# Patient Record
Sex: MALE | Race: AMERICAN INDIAN OR ALASKA NATIVE
[De-identification: names, ages, dates, MRNs, and addresses within clinical notes are randomized per-mention and may not be internally consistent; named-entity substitution may affect disease eponyms.]

---

## 2019-01-04 ENCOUNTER — HOSPITAL ENCOUNTER (INPATIENT)
Dept: HOSPITAL 31 - C.ER | Age: 49
LOS: 6 days | Discharge: HOME | DRG: 603 | End: 2019-01-10
Attending: INTERNAL MEDICINE | Admitting: INTERNAL MEDICINE
Payer: COMMERCIAL

## 2019-01-04 VITALS — BODY MASS INDEX: 31.8 KG/M2

## 2019-01-04 DIAGNOSIS — R05: ICD-10-CM

## 2019-01-04 DIAGNOSIS — L03.116: Primary | ICD-10-CM

## 2019-01-04 DIAGNOSIS — I73.9: ICD-10-CM

## 2019-01-04 DIAGNOSIS — R55: ICD-10-CM

## 2019-01-04 LAB
ALBUMIN SERPL-MCNC: 4.5 G/DL (ref 3.5–5)
ALBUMIN/GLOB SERPL: 1.7 {RATIO} (ref 1–2.1)
ALT SERPL-CCNC: 47 U/L (ref 21–72)
APTT BLD: 26 SECONDS (ref 21–34)
AST SERPL-CCNC: 70 U/L (ref 17–59)
BASE EXCESS BLDV CALC-SCNC: -8 MMOL/L (ref 0–2)
BASE EXCESS BLDV CALC-SCNC: 2.4 MMOL/L (ref 0–2)
BASOPHILS # BLD AUTO: 0.1 K/UL (ref 0–0.2)
BASOPHILS NFR BLD: 0.3 % (ref 0–2)
BILIRUB UR-MCNC: NEGATIVE MG/DL
BUN SERPL-MCNC: 22 MG/DL (ref 9–20)
CALCIUM SERPL-MCNC: 9.5 MG/DL (ref 8.6–10.4)
EOSINOPHIL # BLD AUTO: 0 K/UL (ref 0–0.7)
EOSINOPHIL NFR BLD: 0.1 % (ref 0–4)
ERYTHROCYTE [DISTWIDTH] IN BLOOD BY AUTOMATED COUNT: 12.9 % (ref 11.5–14.5)
GFR NON-AFRICAN AMERICAN: 50
GLUCOSE UR STRIP-MCNC: NORMAL MG/DL
HGB BLD-MCNC: 15.8 G/DL (ref 12–18)
INR PPP: 1.2
LEUKOCYTE ESTERASE UR-ACNC: (no result) LEU/UL
LYMPHOCYTE: 3 % (ref 20–40)
LYMPHOCYTES # BLD AUTO: 0.4 K/UL (ref 1–4.3)
LYMPHOCYTES NFR BLD AUTO: 2.6 % (ref 20–40)
MCH RBC QN AUTO: 29 PG (ref 27–31)
MCHC RBC AUTO-ENTMCNC: 34.1 G/DL (ref 33–37)
MCV RBC AUTO: 85.1 FL (ref 80–94)
MONOCYTE: 2 % (ref 0–10)
MONOCYTES # BLD: 0.7 K/UL (ref 0–0.8)
MONOCYTES NFR BLD: 4 % (ref 0–10)
NEUTROPHILS # BLD: 15.9 K/UL (ref 1.8–7)
NEUTROPHILS NFR BLD AUTO: 81 % (ref 50–75)
NEUTROPHILS NFR BLD AUTO: 93 % (ref 50–75)
NEUTS BAND NFR BLD: 13 % (ref 0–2)
NRBC BLD AUTO-RTO: 0 % (ref 0–2)
PCO2 BLDV: 26 MMHG (ref 40–60)
PCO2 BLDV: 29 MMHG (ref 40–60)
PH BLDV: 7.38 [PH] (ref 7.32–7.43)
PH BLDV: 7.53 [PH] (ref 7.32–7.43)
PH UR STRIP: 7 [PH] (ref 5–8)
PLATELET # BLD EST: NORMAL 10*3/UL
PLATELET # BLD: 199 K/UL (ref 130–400)
PMV BLD AUTO: 7.6 FL (ref 7.2–11.7)
PROT UR STRIP-MCNC: NEGATIVE MG/DL
PROTHROMBIN TIME: 12.8 SECONDS (ref 9.7–12.2)
RBC # BLD AUTO: 5.43 MIL/UL (ref 4.4–5.9)
RBC # UR STRIP: NEGATIVE /UL
SP GR UR STRIP: 1.01 (ref 1–1.03)
TOTAL CELLS COUNTED BLD: 100
UROBILINOGEN UR-MCNC: NORMAL MG/DL (ref 0.2–1)
VARIANT LYMPHS NFR BLD MANUAL: 1 % (ref 0–0)
VENOUS BLOOD FIO2: 21 %
VENOUS BLOOD FIO2: 21 %
VENOUS BLOOD GAS PO2: 25 MM/HG (ref 30–55)
VENOUS BLOOD GAS PO2: 35 MM/HG (ref 30–55)
WBC # BLD AUTO: 17.1 K/UL (ref 4.8–10.8)

## 2019-01-04 NOTE — C.PDOC
History Of Present Illness


Patient is a 48 year old male who presents to the ED for evaluation of a cough 

that has been persistent for 11 days. Patient went to Dr. Nikko Montes for a CXR 

today, after which he suffered a syncopal episode and was found to be febrile, 

as per Dr. Montes. Patient states that he did not lose consciousness and just 

felt SOB and had to sit down. Patient was referred to ED by Dr. Nikko Montes for 

further evaluation. At the present moment patient is no longer SOB. He has 

additional complaint of left groin pain which has progressively gotten worse and

notes pain when he moves. Patient denies any leg weakness, nausea, vomiting, 

chills, abdominal pain, or injury. 


Time Seen by Provider: 19 16:42


Chief Complaint (Nursing): Flu-like Symptoms


History Per: Patient


History/Exam Limitations: no limitations


Onset/Duration Of Symptoms: Days (11 days )


Recent travel outside of the [place]: No


Additional History Per: Patient





Past Medical History


Reviewed: Historical Data, Nursing Documentation, Vital Signs


Vital Signs: 





                                Last Vital Signs











Temp  101.4 F H  19 16:17


 


Pulse  98 H  19 16:17


 


Resp  22   19 16:17


 


BP  130/78   19 16:17


 


Pulse Ox  100   19 16:17














- Medical History


PMH: 


   Denies: Depression


Surgical History: No Surg Hx


Family History: States: Unknown Family Hx





- Social History


Hx Tobacco Use: No


Hx Alcohol Use: Yes


Hx Substance Use: No





- Immunization History


Hx Tetanus Toxoid Vaccination: No


Hx Influenza Vaccination: No


Hx Pneumococcal Vaccination: No





Review Of Systems


Constitutional: Positive for: Fever.  Negative for: Chills, Weakness (leg )


Cardiovascular: Negative for: Chest Pain


Respiratory: Positive for: Cough.  Negative for: Shortness of Breath


Gastrointestinal: Negative for: Nausea, Vomiting, Abdominal Pain


Genitourinary: Positive for: Scrotal Pain





Physical Exam





- Physical Exam


Appears: Non-toxic, No Acute Distress


Skin: Normal Color, Warm, Dry


Head: Atraumatic, Normacephalic


Eye(s): bilateral: Normal Inspection


Oral Mucosa: Moist


Neck: Normal ROM, Supple


Cardiovascular: Rhythm Regular


Respiratory: Normal Breath Sounds, No Rales, No Rhonchi, No Wheezing


Gastrointestinal/Abdominal: No Tenderness


Male Genital: No Testicular Tenderness, No Testicular Swelling, Other (no 

hernia, cellulitis, or erythema. + left groin tenderness )


Extremity: No Tenderness (bilateral leg )


Extremity: Left: Other (Limited ROM left hip due to pain), Right: Normal ROM


Pulses: Left Dorsalis Pedis: Normal, Right Dorsalis Pedis: Normal





ED Course And Treatment





- Laboratory Results


Result Diagrams: 


                                 19 18:04





                                 19 18:04


ECG: Interpreted By Me


ECG Rhythm: Sinus Rhythm


ECG Interpretation: Normal


Interpretation Of ECG: normal axis, normal intervals, no ectopy, no ST elevation


Rate From EC


O2 Sat by Pulse Oximetry: 100 (on RA )


Pulse Ox Interpretation: Normal





- Radiology


CXR: Read By Radiologist


CXR Interpretation: Yes: No Acute Disease





Medical Decision Making


Medical Decision Making: 


Patient is a 48 year old male who has presented to the ED for evaluation of a 

cough. Additional complaint of left groin pain. 





Plan:


Bloodwork, VBG, EKG, CXR, Urinalysis, blood culture, urine culture, Tylenol 975 

mg PO, Piperacillin, IV FLuids, Vancomicin 250 mg IVP 





Patient given tylenol PO for fever and groin pain. On re-eval patient appears 

much more comfortable and reports pain has improved.





Labs done and results noted, particularly elevated lactate and leukocytosis. 

Patient given 30cc/kg NS bolus. Abx ordered.





Case discussed with Dr. EVANGELIST Montes who accepts patient for admission to his service

for fever of unknown origin. CXR clear, no evidence of pneumonia. Urine pending.





Disposition





- Disposition


Disposition: HOSPITALIZED


Disposition Time: 20:34


Condition: STABLE





- Clinical Impression


Clinical Impression: 


 Fever, Left groin pain








- Scribe Statement


The provider has reviewed the documentation as recorded by the Luli Garcia 





All medical record entries made by the Luli were at my direction and 

personally dictated by me. I have reviewed the chart and agree that the record 

accurately reflects my personal performance of the history, physical exam, 

medical decision making, and the department course for this patient. I have also

personally directed, reviewed, and agree with the discharge instructions and 

disposition.

## 2019-01-04 NOTE — CP.PCM.HP
Past Patient History





- Past Social History


Smoking Status: Never Smoked





- PSYCHIATRIC


Hx Depression: No


Hx Substance Use: No





- SURGICAL HISTORY


Hx Surgeries: No





Meds


Allergies/Adverse Reactions: 


                                    Allergies











Allergy/AdvReac Type Severity Reaction Status Date / Time


 


No Known Allergies Allergy   Verified 06/29/13 10:47














Physical Exam





- Constitutional


Appears: Well





- Head Exam


Head Exam: ATRAUMATIC, NORMAL INSPECTION, NORMOCEPHALIC





- Eye Exam


Eye Exam: EOMI, Normal appearance, PERRL


Pupil Exam: NORMAL ACCOMODATION, PERRL





- ENT Exam


ENT Exam: Mucous Membranes Moist, Normal Exam





- Neck Exam


Neck exam: Positive for: Normal Inspection





- Respiratory Exam


Respiratory Exam: Decreased Breath Sounds





- Cardiovascular Exam


Cardiovascular Exam: REGULAR RHYTHM, +S1, +S2





- GI/Abdominal Exam


GI & Abdominal Exam: Diminished Bowel Sounds, Soft





- Rectal Exam


Rectal Exam: Deferred





Results





- Vital Signs


Recent Vital Signs: 





                                Last Vital Signs











Temp  99.6 F   01/04/19 21:10


 


Pulse  88   01/04/19 21:10


 


Resp  20   01/04/19 21:10


 


BP  100/60   01/04/19 21:10


 


Pulse Ox  100   01/04/19 21:10














- Labs


Result Diagrams: 


                                 01/04/19 18:04





                                 01/04/19 18:04


Labs: 





                         Laboratory Results - last 24 hr











  01/04/19 01/04/19 01/04/19





  17:50 18:04 18:04


 


WBC   17.1 H D 


 


RBC   5.43 


 


Hgb   15.8 


 


Hct   46.2 


 


MCV   85.1 


 


MCH   29.0 


 


MCHC   34.1 


 


RDW   12.9 


 


Plt Count   199 


 


MPV   7.6 


 


Neut % (Auto)   93.0 H 


 


Lymph % (Auto)   2.6 L 


 


Mono % (Auto)   4.0 


 


Eos % (Auto)   0.1 


 


Baso % (Auto)   0.3 


 


Neut # (Auto)   15.9 H 


 


Lymph # (Auto)   0.4 L 


 


Mono # (Auto)   0.7 


 


Eos # (Auto)   0.0 


 


Baso # (Auto)   0.1 


 


Neutrophils % (Manual)   81 H 


 


Band Neutrophils %   13 H* 


 


Lymphocytes % (Manual)   3 L 


 


Reactive Lymphs %   1 H 


 


Monocytes % (Manual)   2 


 


Platelet Estimate   Normal 


 


PT    12.8 H


 


INR    1.2


 


APTT    26


 


pO2  35  


 


VBG pH  7.53 H  


 


VBG pCO2  29 L  


 


VBG HCO3  26.2  


 


VBG Total CO2  25.1  


 


VBG O2 Sat (Calc)  84.9 H  


 


VBG Base Excess  2.4 H  


 


VBG Potassium  3.0 L  


 


A-a O2 Difference   


 


Sodium  138.0  


 


Chloride  104.0  


 


Glucose  100  


 


Lactate  2.8 H  


 


FiO2  21.0  


 


Crit Value Called To   


 


Crit Value Called By   


 


Crit Value Read Back   


 


Blood Gas Notified Time   


 


Potassium   


 


Carbon Dioxide   


 


Anion Gap   


 


BUN   


 


Creatinine   


 


Est GFR ( Amer)   


 


Est GFR (Non-Af Amer)   


 


Random Glucose   


 


Calcium   


 


Phosphorus   


 


Magnesium   


 


Total Bilirubin   


 


AST   


 


ALT   


 


Alkaline Phosphatase   


 


Total Protein   


 


Albumin   


 


Globulin   


 


Albumin/Globulin Ratio   


 


Venous Blood Potassium  3.0 L  


 


Urine Color   


 


Urine Clarity   


 


Urine pH   


 


Ur Specific Gravity   


 


Urine Protein   


 


Urine Glucose (UA)   


 


Urine Ketones   


 


Urine Blood   


 


Urine Nitrate   


 


Urine Bilirubin   


 


Urine Urobilinogen   


 


Ur Leukocyte Esterase   


 


Urine WBC (Auto)   


 


Influenza Typ A,B (EIA)   














  01/04/19 01/04/19 01/04/19





  18:04 19:16 19:37


 


WBC   


 


RBC   


 


Hgb   


 


Hct   


 


MCV   


 


MCH   


 


MCHC   


 


RDW   


 


Plt Count   


 


MPV   


 


Neut % (Auto)   


 


Lymph % (Auto)   


 


Mono % (Auto)   


 


Eos % (Auto)   


 


Baso % (Auto)   


 


Neut # (Auto)   


 


Lymph # (Auto)   


 


Mono # (Auto)   


 


Eos # (Auto)   


 


Baso # (Auto)   


 


Neutrophils % (Manual)   


 


Band Neutrophils %   


 


Lymphocytes % (Manual)   


 


Reactive Lymphs %   


 


Monocytes % (Manual)   


 


Platelet Estimate   


 


PT   


 


INR   


 


APTT   


 


pO2   


 


VBG pH   


 


VBG pCO2   


 


VBG HCO3   


 


VBG Total CO2   


 


VBG O2 Sat (Calc)   


 


VBG Base Excess   


 


VBG Potassium   


 


A-a O2 Difference   


 


Sodium  136  


 


Chloride  100  


 


Glucose   


 


Lactate   


 


FiO2   


 


Crit Value Called To   


 


Crit Value Called By   


 


Crit Value Read Back   


 


Blood Gas Notified Time   


 


Potassium  3.3 L  


 


Carbon Dioxide  27  


 


Anion Gap  13  


 


BUN  22 H  


 


Creatinine  1.5  


 


Est GFR ( Amer)  > 60  


 


Est GFR (Non-Af Amer)  50  


 


Random Glucose  97  


 


Calcium  9.5  


 


Phosphorus  < 0.5 L*  


 


Magnesium  1.7  


 


Total Bilirubin  0.8  


 


AST  70 H  


 


ALT  47  


 


Alkaline Phosphatase  66  


 


Total Protein  7.2  


 


Albumin  4.5  


 


Globulin  2.7  


 


Albumin/Globulin Ratio  1.7  


 


Venous Blood Potassium   


 


Urine Color    Yellow


 


Urine Clarity    Clear


 


Urine pH    7.0


 


Ur Specific Gravity    1.006


 


Urine Protein    Negative


 


Urine Glucose (UA)    Normal


 


Urine Ketones    Negative


 


Urine Blood    Negative


 


Urine Nitrate    Negative


 


Urine Bilirubin    Negative


 


Urine Urobilinogen    Normal


 


Ur Leukocyte Esterase    Neg


 


Urine WBC (Auto)    < 1


 


Influenza Typ A,B (EIA)   Negative for flu a/b 














  01/04/19





  19:50


 


WBC 


 


RBC 


 


Hgb 


 


Hct 


 


MCV 


 


MCH 


 


MCHC 


 


RDW 


 


Plt Count 


 


MPV 


 


Neut % (Auto) 


 


Lymph % (Auto) 


 


Mono % (Auto) 


 


Eos % (Auto) 


 


Baso % (Auto) 


 


Neut # (Auto) 


 


Lymph # (Auto) 


 


Mono # (Auto) 


 


Eos # (Auto) 


 


Baso # (Auto) 


 


Neutrophils % (Manual) 


 


Band Neutrophils % 


 


Lymphocytes % (Manual) 


 


Reactive Lymphs % 


 


Monocytes % (Manual) 


 


Platelet Estimate 


 


PT 


 


INR 


 


APTT 


 


pO2  25 L


 


VBG pH  7.38


 


VBG pCO2  26 L


 


VBG HCO3  17.2


 


VBG Total CO2  16.2 L


 


VBG O2 Sat (Calc)  67.5 H


 


VBG Base Excess  -8.0 L


 


VBG Potassium  1.8 L*


 


A-a O2 Difference  92.0


 


Sodium  148.0


 


Chloride  122.0 H


 


Glucose  69 L


 


Lactate  1.7


 


FiO2  21.0


 


Crit Value Called To  Er doctor


 


Crit Value Called By  Ruben


 


Crit Value Read Back  Y


 


Blood Gas Notified Time  2002


 


Potassium 


 


Carbon Dioxide 


 


Anion Gap 


 


BUN 


 


Creatinine 


 


Est GFR ( Amer) 


 


Est GFR (Non-Af Amer) 


 


Random Glucose 


 


Calcium 


 


Phosphorus 


 


Magnesium 


 


Total Bilirubin 


 


AST 


 


ALT 


 


Alkaline Phosphatase 


 


Total Protein 


 


Albumin 


 


Globulin 


 


Albumin/Globulin Ratio 


 


Venous Blood Potassium  1.8 L*


 


Urine Color 


 


Urine Clarity 


 


Urine pH 


 


Ur Specific Gravity 


 


Urine Protein 


 


Urine Glucose (UA) 


 


Urine Ketones 


 


Urine Blood 


 


Urine Nitrate 


 


Urine Bilirubin 


 


Urine Urobilinogen 


 


Ur Leukocyte Esterase 


 


Urine WBC (Auto) 


 


Influenza Typ A,B (EIA)

## 2019-01-04 NOTE — RAD
HISTORY:

 Sepsis Patient 



COMPARISON:

Chest x-ray performed 7/25/13 



TECHNIQUE:

Chest, one view.



FINDINGS:

Examination limited by habitus. 



LUNGS:

No focal consolidation.



Please note that chest x-ray has limited sensitivity for the 

detection of pulmonary masses.



PLEURA:

No significant pleural effusion identified. No definite pneumothorax .



CARDIOVASCULAR:

Heart size appears within normal limits.  Atherosclerotic 

calcification present. 



OSSEOUS STRUCTURES:

No acute osseous abnormality identified.



VISUALIZED UPPER ABDOMEN:

Unremarkable.



OTHER FINDINGS:

None.



IMPRESSION:

No focal consolidation.

## 2019-01-05 RX ADMIN — TAZOBACTAM SODIUM AND PIPERACILLIN SODIUM SCH MLS/HR: 375; 3 INJECTION, SOLUTION INTRAVENOUS at 20:22

## 2019-01-05 RX ADMIN — TAZOBACTAM SODIUM AND PIPERACILLIN SODIUM SCH MLS/HR: 375; 3 INJECTION, SOLUTION INTRAVENOUS at 14:33

## 2019-01-05 RX ADMIN — ENOXAPARIN SODIUM SCH MG: 40 INJECTION SUBCUTANEOUS at 10:05

## 2019-01-05 RX ADMIN — TAZOBACTAM SODIUM AND PIPERACILLIN SODIUM SCH MLS/HR: 375; 3 INJECTION, SOLUTION INTRAVENOUS at 08:25

## 2019-01-05 RX ADMIN — TAZOBACTAM SODIUM AND PIPERACILLIN SODIUM SCH MLS/HR: 375; 3 INJECTION, SOLUTION INTRAVENOUS at 02:19

## 2019-01-06 LAB
ALBUMIN SERPL-MCNC: 3.9 G/DL (ref 3.5–5)
ALBUMIN/GLOB SERPL: 1.4 {RATIO} (ref 1–2.1)
ALT SERPL-CCNC: 33 U/L (ref 21–72)
AST SERPL-CCNC: 35 U/L (ref 17–59)
BASOPHILS # BLD AUTO: 0.1 K/UL (ref 0–0.2)
BASOPHILS NFR BLD: 0.8 % (ref 0–2)
BUN SERPL-MCNC: 14 MG/DL (ref 9–20)
CALCIUM SERPL-MCNC: 8.8 MG/DL (ref 8.6–10.4)
EOSINOPHIL # BLD AUTO: 0.2 K/UL (ref 0–0.7)
EOSINOPHIL NFR BLD: 1.4 % (ref 0–4)
ERYTHROCYTE [DISTWIDTH] IN BLOOD BY AUTOMATED COUNT: 13 % (ref 11.5–14.5)
GFR NON-AFRICAN AMERICAN: 50
HGB BLD-MCNC: 14.5 G/DL (ref 12–18)
LYMPHOCYTES # BLD AUTO: 1.9 K/UL (ref 1–4.3)
LYMPHOCYTES NFR BLD AUTO: 13.5 % (ref 20–40)
MCH RBC QN AUTO: 29.1 PG (ref 27–31)
MCHC RBC AUTO-ENTMCNC: 33.5 G/DL (ref 33–37)
MCV RBC AUTO: 86.8 FL (ref 80–94)
MONOCYTES # BLD: 0.6 K/UL (ref 0–0.8)
MONOCYTES NFR BLD: 4.4 % (ref 0–10)
NEUTROPHILS # BLD: 11.2 K/UL (ref 1.8–7)
NEUTROPHILS NFR BLD AUTO: 79.9 % (ref 50–75)
NRBC BLD AUTO-RTO: 0 % (ref 0–2)
PLATELET # BLD: 167 K/UL (ref 130–400)
PMV BLD AUTO: 7.6 FL (ref 7.2–11.7)
RBC # BLD AUTO: 4.98 MIL/UL (ref 4.4–5.9)
WBC # BLD AUTO: 14 K/UL (ref 4.8–10.8)

## 2019-01-06 RX ADMIN — TAZOBACTAM SODIUM AND PIPERACILLIN SODIUM SCH MLS/HR: 375; 3 INJECTION, SOLUTION INTRAVENOUS at 03:27

## 2019-01-06 RX ADMIN — ENOXAPARIN SODIUM SCH MG: 40 INJECTION SUBCUTANEOUS at 09:59

## 2019-01-06 RX ADMIN — TAZOBACTAM SODIUM AND PIPERACILLIN SODIUM SCH MLS/HR: 375; 3 INJECTION, SOLUTION INTRAVENOUS at 08:23

## 2019-01-06 RX ADMIN — TAZOBACTAM SODIUM AND PIPERACILLIN SODIUM SCH MLS/HR: 375; 3 INJECTION, SOLUTION INTRAVENOUS at 14:03

## 2019-01-06 RX ADMIN — TAZOBACTAM SODIUM AND PIPERACILLIN SODIUM SCH MLS/HR: 375; 3 INJECTION, SOLUTION INTRAVENOUS at 20:17

## 2019-01-06 RX ADMIN — VANCOMYCIN HYDROCHLORIDE SCH MLS/HR: 1 INJECTION, POWDER, LYOPHILIZED, FOR SOLUTION INTRAVENOUS at 17:43

## 2019-01-06 NOTE — CP.PCM.CON
History of Present Illness





- History of Present Illness


History of Present Illness: 





48 year old male came  to the ED for evaluation of a cough that has been 

persistent for 11 days. Patient went to Dr. Nikko Montes for a CXR today, after 

which he suffered a syncopal episode and was found to be febrile, Patient also 

c/o swelling LLE  denies chest pain  fever less  + cough 








- Medical History


PMH: cellulitis 


   Denies: Depression


Surgical History: No Surg Hx


Family History: States: Unknown Family Hx





- Social History


Hx Tobacco Use: No





Review of Systems





- Review of Systems


All systems: reviewed and no additional remarkable complaints except





- Constitutional


Constitutional: As Per HPI





- EENT


Eyes: absent: As Per HPI, Blind Spots, Blurred Vision, Change in Vision, 

Decreased Night Vision, Diplopia, Discharge, Dry Eye, Exophthalmos, Floaters, 

Irritation, Itchy Eyes, Loss of Peripheral Vision, Pain, Photophobia, Requires 

Corrective Lenses, Sees Flashes, Spots in Vision, Tunnel Vision, Other Visual 

Disturbances, Loss of Vision, Other


Ears: absent: As Per HPI, Decreased Hearing, Ear Discharge, Ear Pain, Tinnitus, 

Abnormal Hearing, Disequilibrium, Dizziness, Other


Nose/Mouth/Throat: absent: As Per HPI, Epistaxis, Nasal Congestion, Nasal 

Discharge, Nasal Obstruction, Nasal Trauma, Nose Pain, Post Nasal Drip, Sinus 

Pain, Sinus Pressure, Bleeding Gums, Change in Voice, Dental Pain, Dry Mouth, 

Dysphagia, Halitosis, Hoarsness, Lip Swelling, Mouth Lesions, Mouth Pain, 

Odynophagia, Sore Throat, Throat Swelling, Tongue Swelling, Facial Pain, Neck 

Pain, Neck Mass, Other





- Cardiovascular


Cardiovascular: As Per HPI





- Respiratory


Respiratory: As Per HPI, Cough.  absent: Dyspnea





- Gastrointestinal


Gastrointestinal: absent: As Per HPI, Abdominal Pain, Belching, Bloating, Change

in Bowel Habits, Change in Stool Character, Coffee Ground Emesis, Constipation, 

Cramping, Diarrhea, Dyspepsia, Dysphagia, Early Satiety, Excessive Flatus, Fecal

Incontinence, Heartburn, Hematemesis, Hematochezia, Loose Stools, Melena, 

Nausea, Odynophagia, Temesmus, Vomiting, Other





- Genitourinary


Genitourinary: absent: As Per HPI, Change in Urinary Stream, Difficulty 

Urinating, Dysuria, Flank Pain, Hematuria, Pyuria, Nocturia, Urinary 

Incontinence, Urinary Frequency, Urinary Hesitance, Urinary Urgency, Voiding 

Freq/Small Amts, Freq UTI, Hx Renal/Bladder Calculi, Hx /Renal Surgery, 

Bladder Distension, Other





- Musculoskeletal


Musculoskeletal: As Per HPI





- Integumentary


Integumentary: As Per HPI





- Neurological


Neurological: absent: As Per HPI, Abnormal Gait, Abnormal Hearing, Abnormal 

Movements, Abnormal Speech, Behavioral Changes, Burning Sensations, Confusion, 

Convulsions, Disequilibrium, Dizziness, Numbness, Focal Weakness, Frequent 

Falls, Headaches, Lack of Coordination, Loss of Vision, Memory Loss, 

Paresthesias, Radicular Pain, Restless Legs, Sensory Deficit, Syncope, Tingling,

Tremor, Vertigo, Weakness, Other Visual Disturbances, Other





- Psychiatric


Psychiatric: absent: As Per HPI, Abnormal Sleep Pattern, Anhedonia, Anxiety, 

Auditory Hallucinations, Behavioral Changes, Change in Appetite, Change in 

Libido, Confusion, Depression, Difficulty Concentrating, Hallucinations, 

Homicidal Ideation, Hopelessness, Irritability, Memory Loss, Mood Swings, Panic 

Attacks, Paranoia, Suicidal Ideation, Visual Hallucinations, Tactile 

Hallucinations, Other





- Endocrine


Endocrine: absent: As Per HPI, Change in Body Appearance, Change in Libido, Cold

Intolorance, Deepening of Voice, Excessive Sweating, Fatigue, Flushing, Heat 

Intolorance, Increase in Ring/Shoe/Hat Size, Palpitations, Polydipsia, 

Polyphagia, Polyuria, Other





- Hematologic/Lymphatic


Hematologic: absent: As Per HPI, Easy Bleeding, Easy Bruising, Lymphadenopathy, 

Other





Past Patient History





- Past Medical History & Family History


Past Medical History?: No





- Past Social History


Smoking Status: Never Smoked





- MUSCULOSKELETAL/RHEUMATOLOGICAL


Hx Falls: No





- PSYCHIATRIC


Hx Depression: No


Hx Substance Use: No





- SURGICAL HISTORY


Hx Surgeries: No





- ANESTHESIA


Hx Anesthesia: No





Meds


Allergies/Adverse Reactions: 


                                    Allergies











Allergy/AdvReac Type Severity Reaction Status Date / Time


 


No Known Allergies Allergy   Verified 06/29/13 10:47














- Medications


Medications: 


                               Current Medications





Enoxaparin Sodium (Lovenox)  40 mg SC DAILY [facility]


   Last Admin: 01/06/19 09:59 Dose:  40 mg


Piperacillin Sod/Tazobactam Sod (Zosyn 3.375 Gm Iv Premix)  3.375 gm in 50 mls @

100 mls/hr IVPB Q6H [facility]; Protocol


   Last Admin: 01/06/19 14:03 Dose:  100 mls/hr


Vancomycin/Sodium Chloride (Vancomycin 1 Gm/Ns 200 Ml)  1 gm in 200 mls @ 133 

mls/hr IVPB Q24H [facility]; Protocol


   Stop: 01/10/19 19:01


   Last Admin: 01/05/19 18:01 Dose:  133 mls/hr











Physical Exam





- Constitutional


Appears: Non-toxic





- Head Exam


Head Exam: ATRAUMATIC, NORMOCEPHALIC





- Eye Exam


Eye Exam: EOMI, PERRL.  absent: Scleral icterus





- ENT Exam


ENT Exam: Mucous Membranes Moist.  absent: Normal Oropharynx





- Neck Exam


Neck exam: Negative for: Lymphadenopathy





- Respiratory Exam


Respiratory Exam: Decreased Breath Sounds





- Cardiovascular Exam


Cardiovascular Exam: REGULAR RHYTHM





- GI/Abdominal Exam


GI & Abdominal Exam: Diminished Bowel Sounds, Soft.  absent: Tenderness





- Rectal Exam


Rectal Exam: Deferred





-  Exam


 Exam: NORMAL INSPECTION





- Extremities Exam


Extremities exam: Positive for: pedal edema, tenderness, pedal pulses present.  

Negative for: calf tenderness





- Back Exam


Back exam: absent: CVA tenderness (L), CVA tenderness (R)





- Neurological Exam


Neurological exam: Alert, CN II-XII Intact, Oriented x3, Reflexes Normal





- Psychiatric Exam


Psychiatric exam: Depressed





Results





- Vital Signs


Recent Vital Signs: 


                                Last Vital Signs











Temp  98.1 F   01/06/19 07:48


 


Pulse  65   01/06/19 07:48


 


Resp  20   01/06/19 07:48


 


BP  135/84   01/06/19 07:48


 


Pulse Ox  97   01/06/19 07:48














- Labs


Result Diagrams: 


                                 01/04/19 18:04





                                 01/04/19 18:04





Assessment & Plan


(1) Fever


Status: Acute   





(2) Left groin pain


Status: Acute   





- Assessment and Plan (Free Text)


Assessment: 





cont rx for cellulitis LLE


consider CT angio chest


venous doppler to r/o DVT


DVT prophylaxis

## 2019-01-07 VITALS — RESPIRATION RATE: 20 BRPM

## 2019-01-07 LAB
ALBUMIN SERPL-MCNC: 3.7 G/DL (ref 3.5–5)
ALBUMIN/GLOB SERPL: 1.4 {RATIO} (ref 1–2.1)
ALT SERPL-CCNC: 33 U/L (ref 21–72)
AST SERPL-CCNC: 32 U/L (ref 17–59)
BASOPHILS # BLD AUTO: 0.1 K/UL (ref 0–0.2)
BASOPHILS NFR BLD: 0.6 % (ref 0–2)
BUN SERPL-MCNC: 12 MG/DL (ref 9–20)
CALCIUM SERPL-MCNC: 9 MG/DL (ref 8.6–10.4)
EOSINOPHIL # BLD AUTO: 0.2 K/UL (ref 0–0.7)
EOSINOPHIL NFR BLD: 1.5 % (ref 0–4)
ERYTHROCYTE [DISTWIDTH] IN BLOOD BY AUTOMATED COUNT: 13 % (ref 11.5–14.5)
GFR NON-AFRICAN AMERICAN: 54
HEPATITIS A IGM: NEGATIVE
HEPATITIS B CORE AB: NEGATIVE
HEPATITIS C ANTIBODY: NEGATIVE
HGB BLD-MCNC: 14.7 G/DL (ref 12–18)
LYMPHOCYTES # BLD AUTO: 1.6 K/UL (ref 1–4.3)
LYMPHOCYTES NFR BLD AUTO: 15.6 % (ref 20–40)
MCH RBC QN AUTO: 29.8 PG (ref 27–31)
MCHC RBC AUTO-ENTMCNC: 34.3 G/DL (ref 33–37)
MCV RBC AUTO: 86.9 FL (ref 80–94)
MONOCYTES # BLD: 0.5 K/UL (ref 0–0.8)
MONOCYTES NFR BLD: 4.8 % (ref 0–10)
NEUTROPHILS # BLD: 8.1 K/UL (ref 1.8–7)
NEUTROPHILS NFR BLD AUTO: 77.5 % (ref 50–75)
NRBC BLD AUTO-RTO: 0 % (ref 0–2)
PLATELET # BLD: 185 K/UL (ref 130–400)
PMV BLD AUTO: 7.9 FL (ref 7.2–11.7)
RBC # BLD AUTO: 4.92 MIL/UL (ref 4.4–5.9)
WBC # BLD AUTO: 10.4 K/UL (ref 4.8–10.8)

## 2019-01-07 RX ADMIN — ENOXAPARIN SODIUM SCH MG: 40 INJECTION SUBCUTANEOUS at 09:59

## 2019-01-07 RX ADMIN — VANCOMYCIN HYDROCHLORIDE SCH MLS/HR: 1 INJECTION, POWDER, LYOPHILIZED, FOR SOLUTION INTRAVENOUS at 17:22

## 2019-01-07 RX ADMIN — TAZOBACTAM SODIUM AND PIPERACILLIN SODIUM SCH MLS/HR: 375; 3 INJECTION, SOLUTION INTRAVENOUS at 20:26

## 2019-01-07 RX ADMIN — TAZOBACTAM SODIUM AND PIPERACILLIN SODIUM SCH MLS/HR: 375; 3 INJECTION, SOLUTION INTRAVENOUS at 09:59

## 2019-01-07 RX ADMIN — VANCOMYCIN HYDROCHLORIDE SCH MLS/HR: 1 INJECTION, POWDER, LYOPHILIZED, FOR SOLUTION INTRAVENOUS at 05:18

## 2019-01-07 RX ADMIN — TAZOBACTAM SODIUM AND PIPERACILLIN SODIUM SCH MLS/HR: 375; 3 INJECTION, SOLUTION INTRAVENOUS at 02:30

## 2019-01-07 RX ADMIN — TAZOBACTAM SODIUM AND PIPERACILLIN SODIUM SCH MLS/HR: 375; 3 INJECTION, SOLUTION INTRAVENOUS at 14:16

## 2019-01-07 NOTE — CARD
--------------- APPROVED REPORT --------------





Date of service: 01/04/2019



EKG Measurement

Heart [id number]

[id number]

[id number]

[id number]

[id number]



<Conclusion>

Normal sinus rhythm

Normal ECG

## 2019-01-07 NOTE — CP.PCM.PCO
Physician Communication Note





- Physician Communication Note


Physician Communication Note: AWAIT ULTRASOUND  PROCALCITONIN HIGH  ? ABSCESS 

LLE

## 2019-01-08 LAB
ALBUMIN SERPL-MCNC: 3.7 G/DL (ref 3.5–5)
ALBUMIN/GLOB SERPL: 1.3 {RATIO} (ref 1–2.1)
ALT SERPL-CCNC: 30 U/L (ref 21–72)
AST SERPL-CCNC: 31 U/L (ref 17–59)
BASOPHILS # BLD AUTO: 0.1 K/UL (ref 0–0.2)
BASOPHILS NFR BLD: 0.8 % (ref 0–2)
BUN SERPL-MCNC: 13 MG/DL (ref 9–20)
CALCIUM SERPL-MCNC: 8.9 MG/DL (ref 8.6–10.4)
EOSINOPHIL # BLD AUTO: 0.2 K/UL (ref 0–0.7)
EOSINOPHIL NFR BLD: 3.6 % (ref 0–4)
ERYTHROCYTE [DISTWIDTH] IN BLOOD BY AUTOMATED COUNT: 12.9 % (ref 11.5–14.5)
GFR NON-AFRICAN AMERICAN: 54
HGB BLD-MCNC: 14.9 G/DL (ref 12–18)
LYMPHOCYTES # BLD AUTO: 1.7 K/UL (ref 1–4.3)
LYMPHOCYTES NFR BLD AUTO: 26.1 % (ref 20–40)
MCH RBC QN AUTO: 29.6 PG (ref 27–31)
MCHC RBC AUTO-ENTMCNC: 34.1 G/DL (ref 33–37)
MCV RBC AUTO: 86.9 FL (ref 80–94)
MONOCYTES # BLD: 0.4 K/UL (ref 0–0.8)
MONOCYTES NFR BLD: 5.6 % (ref 0–10)
NEUTROPHILS # BLD: 4.2 K/UL (ref 1.8–7)
NEUTROPHILS NFR BLD AUTO: 63.9 % (ref 50–75)
NRBC BLD AUTO-RTO: 0 % (ref 0–2)
PLATELET # BLD: 205 K/UL (ref 130–400)
PMV BLD AUTO: 7.9 FL (ref 7.2–11.7)
RBC # BLD AUTO: 5.04 MIL/UL (ref 4.4–5.9)
WBC # BLD AUTO: 6.6 K/UL (ref 4.8–10.8)

## 2019-01-08 RX ADMIN — TAZOBACTAM SODIUM AND PIPERACILLIN SODIUM SCH MLS/HR: 375; 3 INJECTION, SOLUTION INTRAVENOUS at 08:54

## 2019-01-08 RX ADMIN — TAZOBACTAM SODIUM AND PIPERACILLIN SODIUM SCH MLS/HR: 375; 3 INJECTION, SOLUTION INTRAVENOUS at 02:26

## 2019-01-08 RX ADMIN — VANCOMYCIN HYDROCHLORIDE SCH MLS/HR: 1 INJECTION, POWDER, LYOPHILIZED, FOR SOLUTION INTRAVENOUS at 16:41

## 2019-01-08 RX ADMIN — TAZOBACTAM SODIUM AND PIPERACILLIN SODIUM SCH MLS/HR: 375; 3 INJECTION, SOLUTION INTRAVENOUS at 14:05

## 2019-01-08 RX ADMIN — TAZOBACTAM SODIUM AND PIPERACILLIN SODIUM SCH MLS/HR: 375; 3 INJECTION, SOLUTION INTRAVENOUS at 21:17

## 2019-01-08 RX ADMIN — ENOXAPARIN SODIUM SCH MG: 40 INJECTION SUBCUTANEOUS at 09:31

## 2019-01-08 RX ADMIN — VANCOMYCIN HYDROCHLORIDE SCH MLS/HR: 1 INJECTION, POWDER, LYOPHILIZED, FOR SOLUTION INTRAVENOUS at 05:17

## 2019-01-08 NOTE — VASCLAB
Date of service: 



01/07/2019



PROCEDURE:  Lower Extremity Venous Duplex Exam.



HISTORY:

r/o DVT  left leg 



PRIORS:

None. 



TECHNIQUE:

Bilateral common femoral, femoral, popliteal and posterior tibial, 

peroneal and great saphenous veins were evaluated. Flow was assessed 

with color Doppler, compressibility, assessment of phasic flow and 

augmentation response.



Report prepared by   Nba Grey, JACKSON, RVT



FINDINGS:



RIGHT:

1. Common Femoral Vein: 



1.1. Compressibility - Fully compressible: Thrombus -  None : Flow - 

Phasic: Augmentation -Normal: Reflux - None.



2. Femoral Vein:



2.1. Compressibility - Fully compressible: Thrombus -  None : Flow - 

Phasic: Augmentation -Normal: Reflux - None.



3. Popliteal Vein: 



3.1. Compressibility - Fully compressible: Thrombus - None :  Flow - 

Phasic: Augmentation -Normal: Reflux - None.



4. Posterior Tibial Vein: 



4.1. Compressibility - Fully compressible: Thrombus -  None: Flow - 

Phasic: Augmentation -Normal: Reflux - None.



5. Peroneal Vein:



5.1. Compressibility - Fully compressible: Thrombus -  None: Flow - 

Phasic: Augmentation -Normal: Reflux - None.



6. Great Saphenous Vein:

6.1. Compressibility - Fully compressible: Thrombus - None: Flow - 

Phasic: Augmentation - Normal: Reflux - None.





LEFT:

1. Common Femoral Vein:



1.1.  Compressibility - Fully compressible: Thrombus -  None: Flow - 

Phasic: Augmentation -Normal: Reflux - None.



2. Femoral Vein:



2.1.  Compressibility - Fully compressible: Thrombus -  None: Flow - 

Phasic: Augmentation -Normal: Reflux - None.



3. Popliteal Vein:



3.1.  Compressibility - Fully compressible: Thrombus -  None : Flow - 

Phasic: Augmentation -Normal: Reflux - None.



4. Posterior Tibial Vein:



4.1.  Compressibility - Fully compressible: Thrombus -  None: Flow - 

Phasic: Augmentation -Normal: Reflux - None.



5. Peroneal Vein:



5.1.  Compressibility - Fully compressible: Thrombus -  None: Flow - 

Phasic: Augmentation -Normal: Reflux - None.



6. Great Saphenous Vein:

6.1.  Compressibility - Fully compressible: Thrombus -  None: Flow - 

Phasic: Augmentation - Normal: Reflux - None.





OTHER FINDINGS:  Right: None significant.



Left: None significant.



IMPRESSION:

Right: 



No evidence of deep or superficial vein thrombosis of the right lower 

extremity. Normal valve function noted of the right side.     



Left: 



No evidence of deep or superficial vein thrombosis of the left lower 

extremity. Normal valve function noted of the left side.

## 2019-01-08 NOTE — CP.PCM.CON
History of Present Illness





- History of Present Illness


History of Present Illness: 





Vascular Surgery Progress note. Dr. Aguilar





49yo M with no significant PMHx here for evaluation of left lower extremity 

swelling and pain. Patient reports left lower extremity swelling for the past 4 

days. Does report some subjective fevers. States that he has had similar 

complaints in the past when he was diagnosed with cellulitis. Pain has been 

persistent. Denies any trauma. No other complaints. No Chest pain. no Shortness 

of breath. No Abd pain. No N/V/D. No urinary complaints. 





PMHx: denies


PSHx: denies


Family Hx: non-contributory


Social Hx: Denies Tobacco use. Denies ETOH use. Denies illicit drugs


NKDA





Review of Systems





- Review of Systems


All systems: reviewed and no additional remarkable complaints except


Review of Systems: 





as per HPI





- Constitutional


Constitutional: Chills, Fever





Past Patient History





- Past Medical History & Family History


Past Medical History?: No


Past Family History: Reviewed and not pertinent





- Past Social History


Smoking Status: Never Smoked


Alcohol: None


Drugs: Denies





- MUSCULOSKELETAL/RHEUMATOLOGICAL


Hx Falls: No





- PSYCHIATRIC


Hx Depression: No


Hx Substance Use: No





- SURGICAL HISTORY


Hx Surgeries: No





- ANESTHESIA


Hx Anesthesia: No





Meds


Allergies/Adverse Reactions: 


                                    Allergies











Allergy/AdvReac Type Severity Reaction Status Date / Time


 


No Known Allergies Allergy   Verified 06/29/13 10:47














- Medications


Medications: 


                               Current Medications





Enoxaparin Sodium (Lovenox)  40 mg SC DAILY [facility]


   Last Admin: 01/08/19 09:31 Dose:  40 mg


Piperacillin Sod/Tazobactam Sod (Zosyn 3.375 Gm Iv Premix)  3.375 gm in 50 mls @

100 mls/hr IVPB Q6H NORTH; Protocol


   Last Admin: 01/08/19 14:05 Dose:  100 mls/hr


Vancomycin/Sodium Chloride (Vancomycin 1 Gm/Ns 200 Ml)  1 gm in 200 mls @ 

133.333 mls/hr IVPB Q12H NORTH; Protocol


   Last Admin: 01/08/19 05:17 Dose:  133.333 mls/hr











Physical Exam





- Constitutional


Appears: Non-toxic





- Head Exam


Head Exam: ATRAUMATIC, NORMAL INSPECTION, NORMOCEPHALIC





- Eye Exam


Eye Exam: EOMI, Normal appearance.  absent: Scleral icterus





- ENT Exam


ENT Exam: Mucous Membranes Moist





- Respiratory Exam


Respiratory Exam: NORMAL BREATHING PATTERN.  absent: Accessory Muscle Use, R

espiratory Distress





- Cardiovascular Exam


Cardiovascular Exam: RRR.  absent: JVD





- GI/Abdominal Exam


GI & Abdominal Exam: Soft.  absent: Distended, Firm, Hernia





- Extremities Exam


Additional comments: 





Mild non-pitting edema to left lower extremity. Mild tenderness to palpation to 

left lower extremity, distal to the shin





- Back Exam


Back exam: NORMAL INSPECTION





- Neurological Exam


Neurological exam: Alert, Oriented x3





- Skin


Skin Exam: Dry, Intact, Normal Color, Warm





Results





- Vital Signs


Recent Vital Signs: 


                                Last Vital Signs











Temp  98.1 F   01/08/19 08:06


 


Pulse  58 L  01/08/19 08:06


 


Resp  20   01/08/19 08:06


 


BP  130/82   01/08/19 08:06


 


Pulse Ox  96   01/08/19 08:06














- Labs


Result Diagrams: 


                                 01/08/19 07:53





                                 01/08/19 07:54


Labs: 


                         Laboratory Results - last 24 hr











  01/07/19 01/08/19 01/08/19





  16:59 04:31 07:53


 


WBC    6.6


 


RBC    5.04


 


Hgb    14.9


 


Hct    43.8


 


MCV    86.9


 


MCH    29.6


 


MCHC    34.1


 


RDW    12.9


 


Plt Count    205


 


MPV    7.9


 


Neut % (Auto)    63.9


 


Lymph % (Auto)    26.1


 


Mono % (Auto)    5.6


 


Eos % (Auto)    3.6


 


Baso % (Auto)    0.8


 


Neut # (Auto)    4.2


 


Lymph # (Auto)    1.7


 


Mono # (Auto)    0.4


 


Eos # (Auto)    0.2


 


Baso # (Auto)    0.1


 


Sodium   


 


Potassium   


 


Chloride   


 


Carbon Dioxide   


 


Anion Gap   


 


BUN   


 


Creatinine   


 


Est GFR ( Amer)   


 


Est GFR (Non-Af Amer)   


 


Random Glucose   


 


Calcium   


 


Total Bilirubin   


 


AST   


 


ALT   


 


Alkaline Phosphatase   


 


Total Protein   


 


Albumin   


 


Globulin   


 


Albumin/Globulin Ratio   


 


Procalcitonin   


 


Vancomycin Trough   7.9 


 


HIV 1&2 Antibody Screen  Negative  














  01/08/19 01/08/19





  07:54 07:54


 


WBC  


 


RBC  


 


Hgb  


 


Hct  


 


MCV  


 


MCH  


 


MCHC  


 


RDW  


 


Plt Count  


 


MPV  


 


Neut % (Auto)  


 


Lymph % (Auto)  


 


Mono % (Auto)  


 


Eos % (Auto)  


 


Baso % (Auto)  


 


Neut # (Auto)  


 


Lymph # (Auto)  


 


Mono # (Auto)  


 


Eos # (Auto)  


 


Baso # (Auto)  


 


Sodium   138


 


Potassium   3.8


 


Chloride   107


 


Carbon Dioxide   25


 


Anion Gap   11


 


BUN   13


 


Creatinine   1.4


 


Est GFR ( Amer)   > 60


 


Est GFR (Non-Af Amer)   54


 


Random Glucose   92


 


Calcium   8.9


 


Total Bilirubin   0.5


 


AST   31


 


ALT   30


 


Alkaline Phosphatase   51


 


Total Protein   6.5


 


Albumin   3.7


 


Globulin   2.8


 


Albumin/Globulin Ratio   1.3


 


Procalcitonin  1.98 H 


 


Vancomycin Trough  


 


HIV 1&2 Antibody Screen  














Assessment & Plan





- Assessment and Plan (Free Text)


Assessment: 





49yo M with left lower extremity swelling. 


- No evidence of DVT on US


Plan: 





- Lower extremity US arterial dopplers ordered, including PVR


- further recs following vascular lab study





Further recs as per Dr. Jeff Garcia PGY2


Surgery

## 2019-01-09 RX ADMIN — TAZOBACTAM SODIUM AND PIPERACILLIN SODIUM SCH MLS/HR: 375; 3 INJECTION, SOLUTION INTRAVENOUS at 09:13

## 2019-01-09 RX ADMIN — ENOXAPARIN SODIUM SCH: 40 INJECTION SUBCUTANEOUS at 09:17

## 2019-01-09 RX ADMIN — TAZOBACTAM SODIUM AND PIPERACILLIN SODIUM SCH MLS/HR: 375; 3 INJECTION, SOLUTION INTRAVENOUS at 02:59

## 2019-01-09 RX ADMIN — VANCOMYCIN HYDROCHLORIDE SCH MLS/HR: 1 INJECTION, POWDER, LYOPHILIZED, FOR SOLUTION INTRAVENOUS at 16:54

## 2019-01-09 RX ADMIN — VANCOMYCIN HYDROCHLORIDE SCH MLS/HR: 1 INJECTION, POWDER, LYOPHILIZED, FOR SOLUTION INTRAVENOUS at 05:01

## 2019-01-09 RX ADMIN — TAZOBACTAM SODIUM AND PIPERACILLIN SODIUM SCH MLS/HR: 375; 3 INJECTION, SOLUTION INTRAVENOUS at 14:05

## 2019-01-09 RX ADMIN — TAZOBACTAM SODIUM AND PIPERACILLIN SODIUM SCH MLS/HR: 375; 3 INJECTION, SOLUTION INTRAVENOUS at 20:40

## 2019-01-10 VITALS
TEMPERATURE: 98.1 F | SYSTOLIC BLOOD PRESSURE: 120 MMHG | OXYGEN SATURATION: 100 % | DIASTOLIC BLOOD PRESSURE: 72 MMHG | HEART RATE: 60 BPM

## 2019-01-10 RX ADMIN — VANCOMYCIN HYDROCHLORIDE SCH MLS/HR: 1 INJECTION, POWDER, LYOPHILIZED, FOR SOLUTION INTRAVENOUS at 04:00

## 2019-01-10 RX ADMIN — ENOXAPARIN SODIUM SCH: 40 INJECTION SUBCUTANEOUS at 09:22

## 2019-01-10 RX ADMIN — VANCOMYCIN HYDROCHLORIDE SCH MLS/HR: 1 INJECTION, POWDER, LYOPHILIZED, FOR SOLUTION INTRAVENOUS at 17:07

## 2019-01-10 RX ADMIN — TAZOBACTAM SODIUM AND PIPERACILLIN SODIUM SCH MLS/HR: 375; 3 INJECTION, SOLUTION INTRAVENOUS at 02:30

## 2019-01-10 NOTE — CP.PCM.PN
Subjective





- Date & Time of Evaluation


Date of Evaluation: 01/05/19


Time of Evaluation: 08:00





- Subjective


Subjective: 


clinically same





Objective





- Vital Signs/Intake and Output


Vital Signs (last 24 hours): 


                                        











Temp Pulse Resp BP Pulse Ox


 


 98.4 F   71   20   108/67   96 


 


 01/05/19 16:34  01/05/19 16:34  01/05/19 16:34  01/05/19 16:34  01/05/19 16:34








Intake and Output: 


                                        











 01/05/19 01/06/19





 18:59 06:59


 


Intake Total 910 


 


Output Total 750 


 


Balance 160 














- Medications


Medications: 


                               Current Medications





Enoxaparin Sodium (Lovenox)  40 mg SC DAILY [facility]


   Last Admin: 01/05/19 10:05 Dose:  40 mg


Piperacillin Sod/Tazobactam Sod (Zosyn 3.375 Gm Iv Premix)  3.375 gm in 50 mls @

100 mls/hr IVPB Q6H [facility]; Protocol


   Last Admin: 01/05/19 20:22 Dose:  100 mls/hr


Vancomycin/Sodium Chloride (Vancomycin 1 Gm/Ns 200 Ml)  1 gm in 200 mls @ 133 

mls/hr IVPB Q24H [facility]; Protocol


   Stop: 01/10/19 19:01


   Last Admin: 01/05/19 18:01 Dose:  133 mls/hr











- Labs


Labs: 


                                        





                                 01/04/19 18:04 





                                 01/04/19 18:04 





                                        











PT  12.8 SECONDS (9.7-12.2)  H  01/04/19  18:04    


 


INR  1.2   01/04/19  18:04    


 


APTT  26 SECONDS (21-34)   01/04/19  18:04
Subjective





- Date & Time of Evaluation


Date of Evaluation: 01/06/19


Time of Evaluation: 07:45





- Subjective


Subjective: 


clinically same





Objective





- Vital Signs/Intake and Output


Vital Signs (last 24 hours): 


                                        











Temp Pulse Resp BP Pulse Ox


 


 98.2 F   57 L  20   105/56 L  97 


 


 01/06/19 16:05  01/06/19 16:05  01/06/19 16:05  01/06/19 16:05  01/06/19 16:05








Intake and Output: 


                                        











 01/06/19 01/07/19





 18:59 06:59


 


Intake Total 700 


 


Balance 700 














- Medications


Medications: 


                               Current Medications





Enoxaparin Sodium (Lovenox)  40 mg SC DAILY [facility]


   Last Admin: 01/06/19 09:59 Dose:  40 mg


Piperacillin Sod/Tazobactam Sod (Zosyn 3.375 Gm Iv Premix)  3.375 gm in 50 mls @

100 mls/hr IVPB Q6H [facility]; Protocol


   Last Admin: 01/06/19 20:17 Dose:  100 mls/hr


Vancomycin/Sodium Chloride (Vancomycin 1 Gm/Ns 200 Ml)  1 gm in 200 mls @ 

133.333 mls/hr IVPB Q12H [facility]; Protocol


   Last Admin: 01/06/19 17:43 Dose:  133.333 mls/hr











- Labs


Labs: 


                                        





                                 01/04/19 18:04 





                                 01/04/19 18:04 





                                        











PT  12.8 SECONDS (9.7-12.2)  H  01/04/19  18:04    


 


INR  1.2   01/04/19  18:04    


 


APTT  26 SECONDS (21-34)   01/04/19  18:04
Subjective





- Date & Time of Evaluation


Date of Evaluation: 01/07/19


Time of Evaluation: 08:15





- Subjective


Subjective: 


clinically same





Objective





- Vital Signs/Intake and Output


Vital Signs (last 24 hours): 


                                        











Temp Pulse Resp BP Pulse Ox


 


 98.0 F   63   20   135/74   99 


 


 01/07/19 16:35  01/07/19 16:35  01/07/19 16:35  01/07/19 16:35  01/07/19 16:35








Intake and Output: 


                                        











 01/07/19 01/07/19





 06:59 18:59


 


Intake Total 250 580


 


Output Total 800 


 


Balance -550 580














- Medications


Medications: 


                               Current Medications





Enoxaparin Sodium (Lovenox)  40 mg SC DAILY [facility]


   Last Admin: 01/07/19 09:59 Dose:  40 mg


Piperacillin Sod/Tazobactam Sod (Zosyn 3.375 Gm Iv Premix)  3.375 gm in 50 mls @

100 mls/hr IVPB Q6H [facility]; Protocol


   Last Admin: 01/07/19 14:16 Dose:  100 mls/hr


Vancomycin/Sodium Chloride (Vancomycin 1 Gm/Ns 200 Ml)  1 gm in 200 mls @ 

133.333 mls/hr IVPB Q12H [facility]; Protocol


   Last Admin: 01/07/19 17:22 Dose:  133.333 mls/hr











- Labs


Labs: 


                                        





                                 01/07/19 07:46 





                                 01/07/19 07:46 





                                        











PT  12.8 SECONDS (9.7-12.2)  H  01/04/19  18:04    


 


INR  1.2   01/04/19  18:04    


 


APTT  26 SECONDS (21-34)   01/04/19  18:04
Subjective





- Date & Time of Evaluation


Date of Evaluation: 01/07/19


Time of Evaluation: 09:00





- Subjective


Subjective: 





AFEB


'C/O PAIN LLE





Objective





- Vital Signs/Intake and Output


Vital Signs (last 24 hours): 


                                        











Temp Pulse Resp BP Pulse Ox


 


 98.1 F   60   95 H  155/84 H  95 


 


 01/07/19 07:00  01/07/19 07:00  01/07/19 07:00  01/07/19 07:00  01/07/19 07:00








Intake and Output: 


                                        











 01/07/19 01/07/19





 06:59 18:59


 


Intake Total 250 


 


Output Total 800 


 


Balance -550 














- Medications


Medications: 


                               Current Medications





Enoxaparin Sodium (Lovenox)  40 mg SC DAILY [facility]


   Last Admin: 01/07/19 09:59 Dose:  40 mg


Piperacillin Sod/Tazobactam Sod (Zosyn 3.375 Gm Iv Premix)  3.375 gm in 50 mls @

100 mls/hr IVPB Q6H [facility]; Protocol


   Last Admin: 01/07/19 09:59 Dose:  100 mls/hr


Vancomycin/Sodium Chloride (Vancomycin 1 Gm/Ns 200 Ml)  1 gm in 200 mls @ 

133.333 mls/hr IVPB Q12H [facility]; Protocol


   Last Admin: 01/07/19 05:18 Dose:  133.333 mls/hr











- Labs


Labs: 


                                        





                                 01/07/19 07:46 





                                 01/07/19 07:46 





                                        











PT  12.8 SECONDS (9.7-12.2)  H  01/04/19  18:04    


 


INR  1.2   01/04/19  18:04    


 


APTT  26 SECONDS (21-34)   01/04/19  18:04    














- Constitutional


Appears: Non-toxic, Chronically Ill





- Head Exam


Head Exam: NORMOCEPHALIC





- Eye Exam


Eye Exam: absent: Scleral icterus





- ENT Exam


ENT Exam: Mucous Membranes Dry





- Neck Exam


Neck Exam: absent: Thyromegaly





- Respiratory Exam


Respiratory Exam: Decreased Breath Sounds





- Cardiovascular Exam


Cardiovascular Exam: REGULAR RHYTHM





- GI/Abdominal Exam


GI & Abdominal Exam: Distended





- Rectal Exam


Rectal Exam: Deferred





-  Exam


 Exam: NORMAL INSPECTION





- Extremities Exam


Extremities Exam: Pedal Edema, Tenderness.  absent: Calf Tenderness





- Back Exam


Back Exam: absent: CVA tenderness (L), CVA tenderness (R), paraspinal tenderness





- Neurological Exam


Neurological Exam: Alert, Awake, Oriented x3





Assessment and Plan


(1) Fever


Status: Acute   





(2) Left groin pain


Status: Acute   





- Assessment and Plan (Free Text)


Assessment: 





AWAIT CULTURES


CONT IV RX


IMAGING NEEDED
Subjective





- Date & Time of Evaluation


Date of Evaluation: 01/08/19


Time of Evaluation: 09:00





- Subjective


Subjective: 





less septic


no abscess left leg


still warm and tender


no pus


all cultures neg


procalcitonin is > 2


cont IV rx and wound care 





Objective





- Vital Signs/Intake and Output


Vital Signs (last 24 hours): 


                                        











Temp Pulse Resp BP Pulse Ox


 


 98.1 F   58 L  20   130/82   96 


 


 01/08/19 08:06  01/08/19 08:06  01/08/19 08:06  01/08/19 08:06  01/08/19 08:06








Intake and Output: 


                                        











 01/08/19 01/08/19





 06:59 18:59


 


Intake Total 550 


 


Output Total 500 


 


Balance 50 














- Medications


Medications: 


                               Current Medications





Enoxaparin Sodium (Lovenox)  40 mg SC DAILY [facility]


   Last Admin: 01/08/19 09:31 Dose:  40 mg


Piperacillin Sod/Tazobactam Sod (Zosyn 3.375 Gm Iv Premix)  3.375 gm in 50 mls @

100 mls/hr IVPB Q6H [facility]; Protocol


   Last Admin: 01/08/19 08:54 Dose:  100 mls/hr


Vancomycin/Sodium Chloride (Vancomycin 1 Gm/Ns 200 Ml)  1 gm in 200 mls @ 

133.333 mls/hr IVPB Q12H [facility]; Protocol


   Last Admin: 01/08/19 05:17 Dose:  133.333 mls/hr











- Labs


Labs: 


                                        





                                 01/08/19 07:53 





                                 01/08/19 07:54 





                                        











PT  12.8 SECONDS (9.7-12.2)  H  01/04/19  18:04    


 


INR  1.2   01/04/19  18:04    


 


APTT  26 SECONDS (21-34)   01/04/19  18:04    














- Constitutional


Appears: Well





- Head Exam


Head Exam: ATRAUMATIC, NORMAL INSPECTION, NORMOCEPHALIC





- Eye Exam


Eye Exam: EOMI, Normal appearance, PERRL


Pupil Exam: NORMAL ACCOMODATION, PERRL





- ENT Exam


ENT Exam: Mucous Membranes Moist, Normal Exam





- Neck Exam


Neck Exam: Full ROM, Normal Inspection.  absent: Lymphadenopathy





- Respiratory Exam


Respiratory Exam: Clear to Ausculation Bilateral, NORMAL BREATHING PATTERN





- Cardiovascular Exam


Cardiovascular Exam: REGULAR RHYTHM, +S1, +S2.  absent: Murmur





- GI/Abdominal Exam


GI & Abdominal Exam: Soft, Normal Bowel Sounds.  absent: Tenderness





- Rectal Exam


Rectal Exam: NORMAL INSPECTION





- Extremities Exam


Extremities Exam: Full ROM, Normal Capillary Refill, Pedal Edema, Tenderness.  

absent: Joint Swelling, Normal Inspection





- Back Exam


Back Exam: NORMAL INSPECTION





- Neurological Exam


Neurological Exam: Alert, Awake, CN II-XII Intact, Normal Gait, Oriented x3





- Psychiatric Exam


Psychiatric exam: Normal Affect, Normal Mood





- Skin


Skin Exam: Dry, Intact, Normal Color, Warm





Assessment and Plan


(1) Fever


Status: Acute   





(2) Left groin pain


Status: Acute
Subjective





- Date & Time of Evaluation


Date of Evaluation: 01/08/19


Time of Evaluation: 17:40





Objective





- Vital Signs/Intake and Output


Vital Signs (last 24 hours): 


                                        











Temp Pulse Resp BP Pulse Ox


 


 98.1 F   55 L  20   146/74   95 


 


 01/08/19 16:00  01/08/19 16:00  01/08/19 16:00  01/08/19 16:00  01/08/19 16:00








Intake and Output: 


                                        











 01/08/19 01/08/19





 06:59 18:59


 


Intake Total 550 


 


Output Total 500 


 


Balance 50 














- Medications


Medications: 


                               Current Medications





Enoxaparin Sodium (Lovenox)  40 mg SC DAILY [facility]


   Last Admin: 01/08/19 09:31 Dose:  40 mg


Piperacillin Sod/Tazobactam Sod (Zosyn 3.375 Gm Iv Premix)  3.375 gm in 50 mls @

100 mls/hr IVPB Q6H [facility]; Protocol


   Last Admin: 01/08/19 14:05 Dose:  100 mls/hr


Vancomycin/Sodium Chloride (Vancomycin 1 Gm/Ns 200 Ml)  1 gm in 200 mls @ 

133.333 mls/hr IVPB Q12H [facility]; Protocol


   Last Admin: 01/08/19 16:41 Dose:  133.333 mls/hr











- Labs


Labs: 


                                        





                                 01/08/19 07:53 





                                 01/08/19 07:54 





                                        











PT  12.8 SECONDS (9.7-12.2)  H  01/04/19  18:04    


 


INR  1.2   01/04/19  18:04    


 


APTT  26 SECONDS (21-34)   01/04/19  18:04
Subjective





- Date & Time of Evaluation


Date of Evaluation: 01/09/19


Time of Evaluation: 07:00





- Subjective


Subjective: 





Surgery: Dr. Aguilar





Pt seen and examined. No acute overnight events. States he feels better this AM 

and leg pain has improved. Pt states he is able to ambulate on his left leg and 

only has pain with pressure. Denies fevers/chills. 





Objective





- Vital Signs/Intake and Output


Vital Signs (last 24 hours): 


                                        











Temp Pulse Resp BP Pulse Ox


 


 97.7 F   48 L  20   122/79   95 


 


 01/09/19 07:12  01/09/19 07:12  01/09/19 07:12  01/09/19 07:12  01/09/19 07:12








Intake and Output: 


                                        











 01/09/19 01/09/19





 06:59 18:59


 


Intake Total 1050 


 


Output Total 650 


 


Balance 400 














- Medications


Medications: 


                               Current Medications





Enoxaparin Sodium (Lovenox)  40 mg SC DAILY [facility]


   Last Admin: 01/08/19 09:31 Dose:  40 mg


Piperacillin Sod/Tazobactam Sod (Zosyn 3.375 Gm Iv Premix)  3.375 gm in 50 mls @

100 mls/hr IVPB Q6H [facility]; Protocol


   Last Admin: 01/09/19 02:59 Dose:  100 mls/hr


Vancomycin/Sodium Chloride (Vancomycin 1 Gm/Ns 200 Ml)  1 gm in 200 mls @ 

133.333 mls/hr IVPB Q12H [facility]; Protocol


   Last Admin: 01/09/19 05:01 Dose:  133.333 mls/hr











- Labs


Labs: 


                                        





                                 01/08/19 07:53 





                                 01/08/19 07:54 





                                        











PT  12.8 SECONDS (9.7-12.2)  H  01/04/19  18:04    


 


INR  1.2   01/04/19  18:04    


 


APTT  26 SECONDS (21-34)   01/04/19  18:04    














- Constitutional


Appears: Well, No Acute Distress





- Head Exam


Head Exam: ATRAUMATIC, NORMOCEPHALIC





- Eye Exam


Eye Exam: Normal appearance





- ENT Exam


ENT Exam: Mucous Membranes Moist





- Respiratory Exam


Respiratory Exam: NORMAL BREATHING PATTERN





- Cardiovascular Exam


Cardiovascular Exam: RRR





- GI/Abdominal Exam


GI & Abdominal Exam: Soft.  absent: Tenderness





- Extremities Exam


Additional comments: 





LLE with celullitis anterior shin, no drainage or purulence noted. 2+ distal 

pulses 





- Neurological Exam


Neurological Exam: Alert, Awake, Oriented x3





- Skin


Skin Exam: Dry, Intact, Warm





Assessment and Plan





- Assessment and Plan (Free Text)


Assessment: 





48M LLE cellulitis 


Plan: 





- f/u GUY/PVRs


- cont ABX


- no plan for vascular surgery intervention at this time


- d/w Dr. Jeff Rojo
Subjective





- Date & Time of Evaluation


Date of Evaluation: 01/09/19


Time of Evaluation: 09:00





- Subjective


Subjective: 





no fever


less pain and swelling left leg


no chest pain or SOB





Objective





- Vital Signs/Intake and Output


Vital Signs (last 24 hours): 


                                        











Temp Pulse Resp BP Pulse Ox


 


 98.2 F   62   20   128/87   96 


 


 01/09/19 16:00  01/09/19 16:00  01/09/19 16:00  01/09/19 16:00  01/09/19 16:00








Intake and Output: 


                                        











 01/09/19 01/09/19





 06:59 18:59


 


Intake Total 1050 300


 


Output Total 650 


 


Balance 400 300














- Medications


Medications: 


                               Current Medications





Enoxaparin Sodium (Lovenox)  40 mg SC DAILY [facility]


   Last Admin: 01/09/19 09:17 Dose:  Not Given


Piperacillin Sod/Tazobactam Sod (Zosyn 3.375 Gm Iv Premix)  3.375 gm in 50 mls @

100 mls/hr IVPB Q6H [facility]; Protocol


   Last Admin: 01/09/19 14:05 Dose:  100 mls/hr


Vancomycin/Sodium Chloride (Vancomycin 1 Gm/Ns 200 Ml)  1 gm in 200 mls @ 

133.333 mls/hr IVPB Q12H [facility]; Protocol


   Last Admin: 01/09/19 16:54 Dose:  133.333 mls/hr











- Labs


Labs: 


                                        





                                 01/08/19 07:53 





                                 01/08/19 07:54 





                                        











PT  12.8 SECONDS (9.7-12.2)  H  01/04/19  18:04    


 


INR  1.2   01/04/19  18:04    


 


APTT  26 SECONDS (21-34)   01/04/19  18:04    














- Constitutional


Appears: Well





- Head Exam


Head Exam: ATRAUMATIC, NORMAL INSPECTION, NORMOCEPHALIC





- Eye Exam


Eye Exam: EOMI, Normal appearance, PERRL


Pupil Exam: NORMAL ACCOMODATION, PERRL





- ENT Exam


ENT Exam: Mucous Membranes Moist, Normal Exam





- Neck Exam


Neck Exam: Full ROM, Normal Inspection.  absent: Lymphadenopathy





- Respiratory Exam


Respiratory Exam: Clear to Ausculation Bilateral, NORMAL BREATHING PATTERN





- Cardiovascular Exam


Cardiovascular Exam: REGULAR RHYTHM, +S1, +S2.  absent: Murmur





- GI/Abdominal Exam


GI & Abdominal Exam: Soft, Normal Bowel Sounds.  absent: Tenderness





- Rectal Exam


Rectal Exam: NORMAL INSPECTION





- Extremities Exam


Extremities Exam: Full ROM, Normal Capillary Refill, Normal Inspection.  absent:

Joint Swelling, Pedal Edema





- Back Exam


Back Exam: NORMAL INSPECTION





- Neurological Exam


Neurological Exam: Alert, Awake, CN II-XII Intact, Normal Gait, Oriented x3





- Psychiatric Exam


Psychiatric exam: Normal Affect, Normal Mood





- Skin


Skin Exam: Dry, Intact, Normal Color, Warm





Assessment and Plan


(1) Fever


Status: Acute   





(2) Left groin pain


Status: Acute   





- Assessment and Plan (Free Text)


Assessment: 





improving procalcitonin level





ok to d/c tomorrow after completion o0f 7 days iv antibiotics 


should not need antibiotics on discharge
Subjective





- Date & Time of Evaluation


Date of Evaluation: 01/09/19


Time of Evaluation: 14:21





Objective





- Vital Signs/Intake and Output


Vital Signs (last 24 hours): 


                                        











Temp Pulse Resp BP Pulse Ox


 


 97.7 F   48 L  20   122/79   95 


 


 01/09/19 07:12  01/09/19 07:12  01/09/19 07:12  01/09/19 07:12  01/09/19 07:12








Intake and Output: 


                                        











 01/09/19 01/09/19





 06:59 18:59


 


Intake Total 1050 300


 


Output Total 650 


 


Balance 400 300














- Medications


Medications: 


                               Current Medications





Enoxaparin Sodium (Lovenox)  40 mg SC DAILY [facility]


   Last Admin: 01/09/19 09:17 Dose:  Not Given


Piperacillin Sod/Tazobactam Sod (Zosyn 3.375 Gm Iv Premix)  3.375 gm in 50 mls @

100 mls/hr IVPB Q6H [facility]; Protocol


   Last Admin: 01/09/19 14:05 Dose:  100 mls/hr


Vancomycin/Sodium Chloride (Vancomycin 1 Gm/Ns 200 Ml)  1 gm in 200 mls @ 

133.333 mls/hr IVPB Q12H [facility]; Protocol


   Last Admin: 01/09/19 05:01 Dose:  133.333 mls/hr











- Labs


Labs: 


                                        





                                 01/08/19 07:53 





                                 01/08/19 07:54 





                                        











PT  12.8 SECONDS (9.7-12.2)  H  01/04/19  18:04    


 


INR  1.2   01/04/19  18:04    


 


APTT  26 SECONDS (21-34)   01/04/19  18:04
Subjective





- Date & Time of Evaluation


Date of Evaluation: 01/10/19


Time of Evaluation: 14:00





- Subjective


Subjective: 





NP NOTES 


patient seen today, leg edema and erythema improved , denies any complaints 


vss  reviewed - stable ,  a febrile 





Objective





- Vital Signs/Intake and Output


Vital Signs (last 24 hours): 


                                        











Temp Pulse Resp BP Pulse Ox


 


 98.3 F   63   20   143/80   96 


 


 01/10/19 07:57  01/10/19 07:57  01/10/19 07:57  01/10/19 07:57  01/10/19 07:57








Intake and Output: 


                                        











 01/10/19 01/10/19





 06:59 18:59


 


Intake Total 550 


 


Balance 550 














- Medications


Medications: 


                               Current Medications





Enoxaparin Sodium (Lovenox)  40 mg SC DAILY [facility]


   Last Admin: 01/10/19 09:22 Dose:  Not Given


Vancomycin/Sodium Chloride (Vancomycin 1 Gm/Ns 200 Ml)  1 gm in 200 mls @ 

133.333 mls/hr IVPB Q12H [facility]; Protocol


   Last Admin: 01/10/19 04:00 Dose:  133.333 mls/hr











- Labs


Labs: 


                                        





                                 01/08/19 07:53 





                                 01/08/19 07:54 





                                        











PT  12.8 SECONDS (9.7-12.2)  H  01/04/19  18:04    


 


INR  1.2   01/04/19  18:04    


 


APTT  26 SECONDS (21-34)   01/04/19  18:04    














Assessment and Plan





- Assessment and Plan (Free Text)


Assessment: 





A/P
17-Jan-2018

## 2019-01-10 NOTE — CP.PCM.DIS
Provider





- Provider


Date of Admission: 


01/04/19 20:33





Attending physician: 


West Montes MD





Consults: 








01/04/19 21:52


Physician Consult Routine 


   Comment: 


   Consulting Provider: Nba Dinero


   Consulting Physician: Nba Dinero


   Reason for Consult: sepsis





01/08/19 08:37


Physician Consult Routine 


   Comment: LLE pain


   Consulting Provider: Zack Aguilar Jr.


   Consulting Physician: Zack Aguilar Jr.


   Reason for Consult: LLE pain














Hospital Course





- Lab Results


Lab Results: 


                                  Micro Results





01/04/19 18:25   Blood   Blood Culture - Final


                            NO GROWTH AFTER 5 DAYS


01/04/19 18:25   Blood   Gram Stain - Final


                            TEST NOT PERFORMED


01/04/19 17:45   Blood   Blood Culture - Final


                            NO GROWTH AFTER 5 DAYS


01/04/19 17:45   Blood   Gram Stain - Final


                            TEST NOT PERFORMED


01/04/19 19:37   Urine   Urine Culture - Final


                            No Growth (<1,000 CFU/ML)





                             Most Recent Lab Values











WBC  6.6 K/uL (4.8-10.8)   01/08/19  07:53    


 


RBC  5.04 Mil/uL (4.40-5.90)   01/08/19  07:53    


 


Hgb  14.9 g/dL (12.0-18.0)   01/08/19  07:53    


 


Hct  43.8 % (35.0-51.0)   01/08/19  07:53    


 


MCV  86.9 fL (80.0-94.0)   01/08/19  07:53    


 


MCH  29.6 pg (27.0-31.0)   01/08/19  07:53    


 


MCHC  34.1 g/dL (33.0-37.0)   01/08/19  07:53    


 


RDW  12.9 % (11.5-14.5)   01/08/19  07:53    


 


Plt Count  205 K/uL (130-400)   01/08/19  07:53    


 


MPV  7.9 fL (7.2-11.7)   01/08/19  07:53    


 


Neut % (Auto)  63.9 % (50.0-75.0)   01/08/19  07:53    


 


Lymph % (Auto)  26.1 % (20.0-40.0)   01/08/19  07:53    


 


Mono % (Auto)  5.6 % (0.0-10.0)   01/08/19  07:53    


 


Eos % (Auto)  3.6 % (0.0-4.0)   01/08/19  07:53    


 


Baso % (Auto)  0.8 % (0.0-2.0)   01/08/19  07:53    


 


Neut # (Auto)  4.2 K/uL (1.8-7.0)   01/08/19  07:53    


 


Lymph # (Auto)  1.7 K/uL (1.0-4.3)   01/08/19  07:53    


 


Mono # (Auto)  0.4 K/uL (0.0-0.8)   01/08/19  07:53    


 


Eos # (Auto)  0.2 K/uL (0.0-0.7)   01/08/19  07:53    


 


Baso # (Auto)  0.1 K/uL (0.0-0.2)   01/08/19  07:53    


 


Neutrophils % (Manual)  81 % (50-75)  H  01/04/19  18:04    


 


Band Neutrophils %  13 % (0-2)  H*  01/04/19  18:04    


 


Lymphocytes % (Manual)  3 % (20-40)  L  01/04/19  18:04    


 


Reactive Lymphs %  1 % (0-0)  H  01/04/19  18:04    


 


Monocytes % (Manual)  2 % (0-10)   01/04/19  18:04    


 


Platelet Estimate  Normal  (NORMAL)   01/04/19  18:04    


 


PT  12.8 SECONDS (9.7-12.2)  H  01/04/19  18:04    


 


INR  1.2   01/04/19  18:04    


 


APTT  26 SECONDS (21-34)   01/04/19  18:04    


 


pO2  25 mm/Hg (30-55)  L  01/04/19  19:50    


 


VBG pH  7.38  (7.32-7.43)   01/04/19  19:50    


 


VBG pCO2  26 mmHg (40-60)  L  01/04/19  19:50    


 


VBG HCO3  17.2 mmol/L  01/04/19  19:50    


 


VBG Total CO2  16.2 mmol/L (22-28)  L  01/04/19  19:50    


 


VBG O2 Sat (Calc)  67.5 % (40-65)  H  01/04/19  19:50    


 


VBG Base Excess  -8.0 mmol/L (0.0-2.0)  L  01/04/19  19:50    


 


VBG Potassium  1.8 mmol/L (3.6-5.2)  L*  01/04/19  19:50    


 


A-a O2 Difference  92.0 mm/Hg  01/04/19  19:50    


 


Sodium  148.0 mmol/l (132-148)   01/04/19  19:50    


 


Chloride  122.0 mmol/L ()  H  01/04/19  19:50    


 


Glucose  69 mg/dl ()  L  01/04/19  19:50    


 


Lactate  1.7 mmol/L (0.7-2.1)   01/04/19  19:50    


 


FiO2  21.0 %  01/04/19  19:50    


 


Crit Value Called To  [facility] doctor   01/04/19  19:50    


 


Crit Value Called By  Ruben   01/04/19  19:50    


 


Crit Value Read Back  Y   01/04/19  19:50    


 


Blood Gas Notified Time  2002 01/04/19  19:50    


 


Sodium  138 mmol/L (132-148)   01/08/19  07:54    


 


Potassium  3.8 mmol/L (3.6-5.2)   01/08/19  07:54    


 


Chloride  107 mmol/L ()   01/08/19  07:54    


 


Carbon Dioxide  25 mmol/L (22-30)   01/08/19  07:54    


 


Anion Gap  11  (10-20)   01/08/19  07:54    


 


BUN  13 mg/dL (9-20)   01/08/19  07:54    


 


Creatinine  1.4 mg/dL (0.8-1.5)   01/08/19  07:54    


 


Est GFR ( Amer)  > 60   01/08/19  07:54    


 


Est GFR (Non-Af Amer)  54   01/08/19  07:54    


 


Random Glucose  92 mg/dL ()   01/08/19  07:54    


 


Calcium  8.9 mg/dl (8.6-10.4)   01/08/19  07:54    


 


Phosphorus  3.8 mg/dL (2.5-4.5)   01/07/19  07:46    


 


Magnesium  2.0 mg/dL (1.6-2.3)   01/07/19  07:46    


 


Total Bilirubin  0.5 mg/dL (0.2-1.3)   01/08/19  07:54    


 


AST  31 U/L (17-59)   01/08/19  07:54    


 


ALT  30 U/L (21-72)   01/08/19  07:54    


 


Alkaline Phosphatase  51 U/L ()   01/08/19  07:54    


 


Total Protein  6.5 g/dL (6.3-8.3)   01/08/19  07:54    


 


Albumin  3.7 g/dL (3.5-5.0)   01/08/19  07:54    


 


Globulin  2.8 gm/dL (2.2-3.9)   01/08/19  07:54    


 


Albumin/Globulin Ratio  1.3  (1.0-2.1)   01/08/19  07:54    


 


Procalcitonin  0.96 NG/ML (0.19-0.49)  H  01/09/19  08:07    


 


Venous Blood Potassium  1.8 mmol/L (3.6-5.2)  L*  01/04/19  19:50    


 


Urine Color  Yellow  (YELLOW)   01/04/19  19:37    


 


Urine Clarity  Clear  (Clear)   01/04/19  19:37    


 


Urine pH  7.0  (5.0-8.0)   01/04/19  19:37    


 


Ur Specific Gravity  1.006  (1.003-1.030)   01/04/19  19:37    


 


Urine Protein  Negative mg/dL (NEGATIVE)   01/04/19  19:37    


 


Urine Glucose (UA)  Normal mg/dL (Normal)   01/04/19  19:37    


 


Urine Ketones  Negative mg/dL (NEGATIVE)   01/04/19  19:37    


 


Urine Blood  Negative  (NEGATIVE)   01/04/19  19:37    


 


Urine Nitrate  Negative  (NEGATIVE)   01/04/19  19:37    


 


Urine Bilirubin  Negative  (NEGATIVE)   01/04/19  19:37    


 


Urine Urobilinogen  Normal mg/dL (0.2-1.0)   01/04/19  19:37    


 


Ur Leukocyte Esterase  Neg Jose Rafael/uL (Negative)   01/04/19  19:37    


 


Urine WBC (Auto)  < 1 /hpf (0-5)   01/04/19  19:37    


 


Vancomycin Trough  7.9 ug/mL (5.0-10.0)   01/08/19  04:31    


 


Hepatitis A IgM Ab  Negative  (NEGATIVE)   01/06/19  20:48    


 


Hep Bs Antigen  Negative  (NEGATIVE)   01/06/19  20:48    


 


Hep B Core IgM Ab  Negative  (NEGATIVE)   01/06/19  20:48    


 


Hepatitis C Antibody  Negative  (NEGATIVE)   01/06/19  20:48    


 


HIV 1&2 Antibody Screen  Negative  (NEGATIVE)   01/07/19  16:59    


 


Influenza Typ A,B (EIA)  Negative for flu a/b  (NEGATIVE)   01/04/19  19:16    














Discharge Exam





- Head Exam


Head Exam: ATRAUMATIC, NORMAL INSPECTION, NORMOCEPHALIC





Discharge Plan





- Follow Up Plan


Condition: STABLE


Disposition: HOME/ ROUTINE


Referrals: 


Nba Dinero MD [Staff Provider] - 


Faviola Montes MD [Staff Provider] -

## 2019-01-10 NOTE — VASCLAB
Date of service: 



01/09/2019



STUDY DESCRIPTION:  Lower Extremity Arterial Exam (PVR).



HISTORY:

 peripheral vascular disease 



PRIORS:

None. 



TECHNIQUE:

Pulse volume recording waveforms and segmental pressures of bilateral 

lower extremities at multiple levels were obtained. Ankle Brachial 

Indices (ABIs) were calculated.



Report prepared by  JACKSON Botello, RVT



RIGHT LOWER EXTREMITY:  

* Brachial artery: Pressure - 121 mmHg. 



* High thigh: Pressure - 167 mmHg: Ratio - 1.38: PVR waveform -  

Pulsatile



* Low thigh: Pressure - 151 mmHg: Ratio -  1.25 PVR waveform:  

Pulsatile



* Calf: Pressure - 165 mmHg: Ratio - 1.36 PVR waveform:  Pulsatile



* Posterior tibial Artery: Pressure - 147 mmHg: Ratio - 1.21 PVR 

waveform:  Pulsatile  



* Dorsalis pedis Artery: Pressure - 141 mmHg: Ratio - 1.17 PVR 

waveform:  Pulsatile



* Great toe: Pressure -  mmHg: Ratio -  PVR waveform:  



Ankle brachial index (GUY): 1.21



LEFT LOWER EXTREMITY:  

* Brachial artery: Pressure - 115 mmHg. 



* High thigh: Pressure - 182 mmHg: Ratio - 1.50: PVR waveform -  

Pulsatile



* Low thigh: Pressure - 167 mmHg: Ratio - 1.38 PVR waveform:  

Pulsatile



* Calf: Pressure - 155 mmHg: Ratio - 1.28 PVR waveform:  Pulsatile 



* Posterior tibial Artery: Pressure - 149 mmHg: Ratio - 1.23 PVR 

waveform:  Pulsatile



* Dorsalis pedis Artery: Pressure - 145 mmHg: Ratio - 1.20 PVR 

waveform:   Pulsatile



* Great toe: Pressure -  mmHg: Ratio -  PVR waveform:  Pulsatile 



Ankle brachial index (GUY): 1.23



OTHER FINDINGS:  Right: 



Left:  



IMPRESSION:

Right: There was no evidence of hemodynamically significant arterial 

insufficiency in the right lower extremity.



Left: There was no evidence of hemodynamically significant arterial 

insufficiency in the left lower extremity.